# Patient Record
Sex: FEMALE | Race: WHITE
[De-identification: names, ages, dates, MRNs, and addresses within clinical notes are randomized per-mention and may not be internally consistent; named-entity substitution may affect disease eponyms.]

---

## 2018-05-05 ENCOUNTER — HOSPITAL ENCOUNTER (EMERGENCY)
Dept: HOSPITAL 74 - JER | Age: 23
LOS: 1 days | Discharge: HOME | End: 2018-05-06
Payer: COMMERCIAL

## 2018-05-05 VITALS — DIASTOLIC BLOOD PRESSURE: 79 MMHG | HEART RATE: 79 BPM | TEMPERATURE: 98.5 F | SYSTOLIC BLOOD PRESSURE: 126 MMHG

## 2018-05-05 VITALS — BODY MASS INDEX: 23.5 KG/M2

## 2018-05-05 DIAGNOSIS — J18.9: Primary | ICD-10-CM

## 2018-05-05 PROCEDURE — 3E0F7GC INTRODUCTION OF OTHER THERAPEUTIC SUBSTANCE INTO RESPIRATORY TRACT, VIA NATURAL OR ARTIFICIAL OPENING: ICD-10-PCS | Performed by: EMERGENCY MEDICINE

## 2018-05-06 NOTE — PDOC
History of Present Illness





- General


History Source: Patient, Old Records


Exam Limitations: No Limitations





- History of Present Illness


Initial Comments: 





05/06/18 03:05


Patient is a 22 year old female with a significant past medical history of 

Asthma, who presents to the ED with complaints of cold like symptoms that began 

earlier today. Patient reports experiencing chronic shortness of breath, as 

well as associated symptoms of sore throat, left ear pain and vomiting.  She 

reports experiencing runny nose and productive cough with green sputum that 

began tuesday afternoon. Patient reports symptoms showed no signs of subsiding, 

prompting her to come into the ED for further evaluation.  She reports taking 1 

dose of dayquil this morning with minimum relief. 





Denies chest pain. Denies fevers, chills. Denies out of state travelling. 

Denies diarrhea, constipation, hematuria, dysuria. Denies any other symptoms. 





Allergies: Egg, Shellfish. 


Social history: . No smoking. No alcohol. No illicit drugs. 


Surgical history: None


PMD: None








<Nelson Garcia - Last Filed: 05/06/18 03:04>





<Valeria Gutierrez - Last Filed: 05/06/18 05:26>





- General


Chief Complaint: Respiratory


Stated Complaint: ASTHMA


Time Seen by Provider: 05/06/18 00:40





Past History





<Nelson Garcia - Last Filed: 05/06/18 03:04>





- Past Medical History


Asthma: Yes


COPD: No





- Suicide/Smoking/Psychosocial Hx


Smoking Status: No


Smoking History: Never smoked


Have you smoked in the past 12 months: No


Number of Cigarettes Smoked Daily: 0


Information on smoking cessation initiated: No


Hx Alcohol Use: No


Drug/Substance Use Hx: No


Substance Use Type: None





<Valeria Gutierrez - Last Filed: 05/06/18 05:26>





- Past Medical History


Allergies/Adverse Reactions: 


 Allergies











Allergy/AdvReac Type Severity Reaction Status Date / Time


 


egg Allergy Severe Swelling Verified 05/05/18 22:56


 


shellfish derived Allergy Intermediate Swelling Verified 05/05/18 22:55











Home Medications: 


Ambulatory Orders





Azithromycin [Zithromax -] 250 mg PO DAILY #4 tablet 05/06/18 











**Review of Systems





- Review of Systems


Able to Perform ROS?: Yes


Comments:: 





05/06/18 03:05


GENERAL/CONSTITUTIONAL: No fever or chills. No weakness.


HEAD, EYES, EARS, NOSE AND THROAT: +Left ear pain. 


No change in vision. No ear discharge. No sore throat.


CARDIOVASCULAR: +Shortness of breath


No chest pain 


RESPIRATORY: +Cough


No wheezing, or hemoptysis.


GASTROINTESTINAL: +Nausea. +vomiting. 


No diarrhea or constipation.


GENITOURINARY: No dysuria, frequency, or change in urination.


MUSCULOSKELETAL: No joint or muscle swelling or pain. No neck or back pain.


SKIN: No rash


NEUROLOGIC: No headache, vertigo, loss of consciousness, or change in strength/

sensation.


ENDOCRINE: No increased thirst. No abnormal weight change.


HEMATOLOGIC/LYMPHATIC: No anemia, easy bleeding, or history of blood clots.


ALLERGIC/IMMUNOLOGIC: No hives or skin allergy.








<Nelson Garcia - Last Filed: 05/06/18 03:04>





*Physical Exam





- Vital Signs


 Last Vital Signs











Temp Pulse Resp BP Pulse Ox


 


 98.5 F   79   18   126/79   99 


 


 05/05/18 22:56  05/05/18 22:56  05/05/18 22:56  05/05/18 22:56  05/05/18 22:56














- Physical Exam


Comments: 





05/06/18 03:05


GENERAL: Awake, alert, and fully oriented, in no acute distress


HEAD: No signs of trauma


EYES: PERRLA, EOMI, sclera anicteric, conjunctiva clear


ENT: Auricles normal inspection, hearing grossly normal, nares patent, 

oropharynx clear without exudates. Moist mucosa


NECK: Normal ROM, supple, no lymphadenopathy, JVD, or masses


LUNGS: +Coarse left sided breath sounds. 


No wheezes, and no crackles


HEART: Regular rate and rhythm, normal S1 and S2, no murmurs, rubs or gallops


ABDOMEN: Soft, nontender, normoactive bowel sounds.  No guarding, no rebound.  

No masses


EXTREMITIES: Normal range of motion, no edema.  No clubbing or cyanosis. No 

cords, erythema, or tenderness


NEUROLOGICAL: Cranial nerves II through XII grossly intact.  Normal speech, 

normal gait


SKIN: Warm, Dry, normal turgor, no rashes or lesions noted.








<Nelson Garcia - Last Filed: 05/06/18 03:04>





- Vital Signs


 Last Vital Signs











Temp Pulse Resp BP Pulse Ox


 


 98.5 F   79   18   126/79   99 


 


 05/05/18 22:56  05/05/18 22:56  05/05/18 22:56  05/05/18 22:56  05/05/18 22:56














<Valeria Gutierrez - Last Filed: 05/06/18 05:26>





ED Treatment Course





- ADDITIONAL ORDERS


Additional order review: 


 Laboratory  Results











  05/06/18





  00:40


 


Urine HCG, Qual  Negative














- Medications


Given in the ED: 


ED Medications














Discontinued Medications














Generic Name Dose Route Start Last Admin





  Trade Name Amada  PRN Reason Stop Dose Admin


 


Albuterol/Ipratropium  1 amp  05/06/18 01:04  05/06/18 01:24





  Duoneb -  NEB  05/06/18 01:05  1 amp





  ONCE ONE   Administration


 


Azithromycin  500 mg  05/06/18 00:55  05/06/18 01:24





  Zithromax -  PO  05/06/18 00:56  500 mg





  ONCE ONE   Administration














<Nelson Garcia - Last Filed: 05/06/18 03:04>





- ADDITIONAL ORDERS


Additional order review: 


 Laboratory  Results











  05/06/18





  00:40


 


Urine HCG, Qual  Negative














<Valeria Gutierrez - Last Filed: 05/06/18 05:26>





Medical Decision Making





- Medical Decision Making





05/06/18 05:25


Pt comes with cough and congestion. 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 

are ill,


Pt will be preemptively teated with a zpak.


No other complaints.


Home with zpak and antipyretics.





<Valeria Gutierrez - Last Filed: 05/06/18 05:26>





*DC/Admit/Observation/Transfer





- Attestations


Scribe Attestion: 





05/06/18 03:05





Documentation prepared by Nelson Garcia, acting as medical scribe for Valeria Gutierrez MD/DO.





<Nelson Garcia - Last Filed: 05/06/18 03:04>





- Discharge Dispostion


Admit: No





<Valeria Gutierrez - Last Filed: 05/06/18 05:26>


Diagnosis at time of Disposition: 


 Atypical pneumonia, Congestion of nasal sinus








- Discharge Dispostion


Disposition: HOME


Condition at time of disposition: Stable





- Prescriptions


Prescriptions: 


Azithromycin [Zithromax -] 250 mg PO DAILY #4 tablet





- Patient Instructions


Printed Discharge Instructions:  DI for Pneumonia -- Adult, DI for Acute 

Bronchitis





- Post Discharge Activity


Forms/Work/School Notes:  Back to Work